# Patient Record
Sex: FEMALE | Employment: UNEMPLOYED | URBAN - METROPOLITAN AREA
[De-identification: names, ages, dates, MRNs, and addresses within clinical notes are randomized per-mention and may not be internally consistent; named-entity substitution may affect disease eponyms.]

---

## 2020-07-03 ENCOUNTER — APPOINTMENT (OUTPATIENT)
Dept: GENERAL RADIOLOGY | Facility: CLINIC | Age: 20
End: 2020-07-03
Attending: EMERGENCY MEDICINE
Payer: OTHER GOVERNMENT

## 2020-07-03 ENCOUNTER — HOSPITAL ENCOUNTER (EMERGENCY)
Facility: CLINIC | Age: 20
Discharge: HOME | End: 2020-07-03
Attending: EMERGENCY MEDICINE | Admitting: EMERGENCY MEDICINE
Payer: OTHER GOVERNMENT

## 2020-07-03 VITALS
HEIGHT: 64 IN | SYSTOLIC BLOOD PRESSURE: 93 MMHG | HEART RATE: 74 BPM | BODY MASS INDEX: 32.44 KG/M2 | TEMPERATURE: 98.7 F | DIASTOLIC BLOOD PRESSURE: 63 MMHG | RESPIRATION RATE: 16 BRPM | WEIGHT: 190 LBS | OXYGEN SATURATION: 100 %

## 2020-07-03 DIAGNOSIS — R05.9 COUGH: ICD-10-CM

## 2020-07-03 DIAGNOSIS — R06.02 SHORTNESS OF BREATH: ICD-10-CM

## 2020-07-03 DIAGNOSIS — U07.1 INFECTION DUE TO 2019-NCOV: ICD-10-CM

## 2020-07-03 LAB
ANION GAP SERPL CALCULATED.3IONS-SCNC: 4 MMOL/L (ref 3–14)
BASOPHILS # BLD AUTO: 0 10E9/L (ref 0–0.2)
BASOPHILS NFR BLD AUTO: 0.3 %
BUN SERPL-MCNC: 7 MG/DL (ref 7–30)
CALCIUM SERPL-MCNC: 8.6 MG/DL (ref 8.5–10.1)
CHLORIDE SERPL-SCNC: 105 MMOL/L (ref 96–110)
CO2 SERPL-SCNC: 28 MMOL/L (ref 20–32)
CREAT SERPL-MCNC: 0.97 MG/DL (ref 0.5–1)
DIFFERENTIAL METHOD BLD: ABNORMAL
EOSINOPHIL # BLD AUTO: 0 10E9/L (ref 0–0.7)
EOSINOPHIL NFR BLD AUTO: 0.3 %
ERYTHROCYTE [DISTWIDTH] IN BLOOD BY AUTOMATED COUNT: 15.9 % (ref 10–15)
GFR SERPL CREATININE-BSD FRML MDRD: 84 ML/MIN/{1.73_M2}
GLUCOSE SERPL-MCNC: 103 MG/DL (ref 70–99)
HCT VFR BLD AUTO: 39 % (ref 35–47)
HGB BLD-MCNC: 12.1 G/DL (ref 11.7–15.7)
IMM GRANULOCYTES # BLD: 0 10E9/L (ref 0–0.4)
IMM GRANULOCYTES NFR BLD: 0 %
LYMPHOCYTES # BLD AUTO: 1.9 10E9/L (ref 0.8–5.3)
LYMPHOCYTES NFR BLD AUTO: 56.8 %
MCH RBC QN AUTO: 27.6 PG (ref 26.5–33)
MCHC RBC AUTO-ENTMCNC: 31 G/DL (ref 31.5–36.5)
MCV RBC AUTO: 89 FL (ref 78–100)
MONOCYTES # BLD AUTO: 0.4 10E9/L (ref 0–1.3)
MONOCYTES NFR BLD AUTO: 12.5 %
NEUTROPHILS # BLD AUTO: 1 10E9/L (ref 1.6–8.3)
NEUTROPHILS NFR BLD AUTO: 30.1 %
NRBC # BLD AUTO: 0 10*3/UL
NRBC BLD AUTO-RTO: 0 /100
PLATELET # BLD AUTO: 255 10E9/L (ref 150–450)
POTASSIUM SERPL-SCNC: 3.8 MMOL/L (ref 3.4–5.3)
RBC # BLD AUTO: 4.39 10E12/L (ref 3.8–5.2)
SODIUM SERPL-SCNC: 137 MMOL/L (ref 133–144)
WBC # BLD AUTO: 3.4 10E9/L (ref 4–11)

## 2020-07-03 PROCEDURE — 71045 X-RAY EXAM CHEST 1 VIEW: CPT

## 2020-07-03 PROCEDURE — 99283 EMERGENCY DEPT VISIT LOW MDM: CPT

## 2020-07-03 PROCEDURE — 85025 COMPLETE CBC W/AUTO DIFF WBC: CPT | Performed by: EMERGENCY MEDICINE

## 2020-07-03 PROCEDURE — U0003 INFECTIOUS AGENT DETECTION BY NUCLEIC ACID (DNA OR RNA); SEVERE ACUTE RESPIRATORY SYNDROME CORONAVIRUS 2 (SARS-COV-2) (CORONAVIRUS DISEASE [COVID-19]), AMPLIFIED PROBE TECHNIQUE, MAKING USE OF HIGH THROUGHPUT TECHNOLOGIES AS DESCRIBED BY CMS-2020-01-R: HCPCS | Performed by: EMERGENCY MEDICINE

## 2020-07-03 PROCEDURE — 99283 EMERGENCY DEPT VISIT LOW MDM: CPT | Mod: Z6 | Performed by: EMERGENCY MEDICINE

## 2020-07-03 PROCEDURE — 80048 BASIC METABOLIC PNL TOTAL CA: CPT | Performed by: EMERGENCY MEDICINE

## 2020-07-03 PROCEDURE — C9803 HOPD COVID-19 SPEC COLLECT: HCPCS

## 2020-07-03 ASSESSMENT — ENCOUNTER SYMPTOMS
NAUSEA: 1
DIARRHEA: 1
MYALGIAS: 1
FEVER: 1
CHILLS: 1
VOMITING: 0
SHORTNESS OF BREATH: 1
NERVOUS/ANXIOUS: 1
RHINORRHEA: 1
SORE THROAT: 1
COUGH: 1
ABDOMINAL PAIN: 0
FATIGUE: 1
BACK PAIN: 1

## 2020-07-03 ASSESSMENT — MIFFLIN-ST. JEOR: SCORE: 1621.83

## 2020-07-03 NOTE — LETTER
July 4, 2020        Erin Loja [facility]  [address] [place] [id number]    This letter provides a written record that you were tested for COVID-19 on 7/3/20.     Your result was positive. This means you have COVID-19 (coronavirus).    This means that we found the virus that causes COVID-19 in your sample.    How can I protect others?If you have symptoms, stay home and away from others (self-isolate) until:You ve had no fever--and no medicine that reduces fever--for 3 full days (72 hours). And      Your other symptoms have gotten better. For example, your cough or breathing has improved. And     At least 10 days have passed since your symptoms started.    If you don t have symptoms: Stay home and away from others (self-isolate) until at least 10 days have passed since your first positive COVID-19 test.    During this time:    Stay in your own room, including for meals. Use your own bathroom if you can.    Stay away from others in your home. No hugging, kissing or shaking hands. No visitors.     Don t go to work, school or anywhere else.     Clean  high touch  surfaces often (doorknobs, counters, handles, etc.). Use a household cleaning spray or wipes. You ll find a full list on the EPA website at www.epa.gov/pesticide-registration/list-n-disinfectants-use-against-sars-cov-2.     Cover your mouth and nose with a mask, tissue or washcloth to avoid spreading germs.    Wash your hands and face often with soap and water.    Caregivers in these groups are at risk for severe illness due to COVID-19:  o People 65 years and older  o People who live in a nursing home or long-term care facility  o People with chronic disease (lung, heart, cancer, diabetes, kidney, liver, immunologic)  o People who have a weakened immune system, including those who:  - Are in cancer treatment  - Take medicine that weakens the immune system, such as corticosteroids  - Had a bone marrow or organ transplant  - Have an immune  deficiency  - Have poorly controlled HIV or AIDS  - Are obese (body mass index of 40 or higher)  - Smoke regularly    Caregivers should wear gloves while washing dishes, handling laundry and cleaning bedrooms and bathrooms.    Wash and dry laundry with special caution. Don t shake dirty laundry, and use the warmest water setting you can.    If you have a weakened immune system, ask your doctor about other actions you should take.    For more tips, go to www.cdc.gov/coronavirus/2019-ncov/downloads/10Things.pdf.    You should not go back to work until you meet the guidelines above for ending your home isolation. You should meet these along with any other guidelines that your employer has.    Employers: This document serves as formal notice of your employee s medical guidelines for going back to work. They must meet the above guidelines before going back to work in person.    How can I take care of myself?    1. Get lots of rest. Drink extra fluids (unless a doctor has told you not to).    2. Take Tylenol (acetaminophen) for fever or pain. If you have liver or kidney problems, ask your family doctor if it s okay to take Tylenol.     Take either:     650 mg (two 325 mg pills) every 4 to 6 hours, or     1,000 mg (two 500 mg pills) every 8 hours as needed.     Note: Don t take more than 3,000 mg in one day. Acetaminophen is found in many medicines (both prescribed and over-the-counter medicines). Read all labels to be sure you don t take too much.    For children, check the Tylenol bottle for the right dose (based on their age or weight).    3. If you have other health problems (like cancer, heart failure, an organ transplant or severe kidney disease): Call your specialty clinic if you don t feel better in the next 2 days.    4. Know when to call 911: Emergency warning signs include:    Trouble breathing or shortness of breath    Pain or pressure in the chest that doesn t go away    Feeling confused like you haven t felt  before, or not being able to wake up    Bluish-colored lips or face    5. Sign up for [employer]. We know it s scary to hear that you have COVID-19. We want to track your symptoms to make sure you re okay over the next 2 weeks. Please look for an email from [employer]--this is a free, online program that we ll use to keep in touch. To sign up, follow the link in the email. Learn more at [email or web address].      Where can I get more information?    [facility]: [email or web address]    Coronavirus Basics: www.health.[email or web address].us/diseases/coronavirus/basics.html    What to Do If You re Sick: www.cdc.gov/coronavirus/2019-ncov/about/steps-when-sick.html    Ending Home Isolation: www.cdc.gov/coronavirus/2019-ncov/hcp/disposition-in-home-patients.html     Caring for Someone with COVID-19: www.cdc.gov/coronavirus/2019-ncov/if-you-are-sick/care-for-someone.html     [facility] clinical trials (COVID-19 research studies): clinicalaffairs.[facility]-clinical-trials

## 2020-07-03 NOTE — ED AVS SNAPSHOT
[facility], [place], Emergency Department  [address] [place] [id number]  Phone:  [phone]                                    Erin Maria   MRN: [id number]    Department:  [facility], Emergency Department   Date of Visit:  7/3/2020           After Visit Summary Signature Page    I have received my discharge instructions, and my questions have been answered. I have discussed any challenges I see with this plan with the nurse or doctor.    ..........................................................................................................................................  Patient/Patient Representative Signature      ..........................................................................................................................................  Patient Representative Print Name and Relationship to Patient    ..................................................               ................................................  Date                                   Time    ..........................................................................................................................................  Reviewed by Signature/Title    ...................................................              ..............................................  Date                                               Time          22EPIC Rev 08/18

## 2020-07-04 ENCOUNTER — TELEPHONE (OUTPATIENT)
Dept: NURSING | Facility: CLINIC | Age: 20
End: 2020-07-04

## 2020-07-04 LAB
SARS-COV-2 RNA SPEC QL NAA+PROBE: ABNORMAL
SPECIMEN SOURCE: ABNORMAL

## 2020-07-04 NOTE — ED PROVIDER NOTES
[place] EMERGENCY DEPARTMENT ([facility])  7/03/20    History     Chief Complaint   Patient presents with     Shortness of Breath     History was provided by the patient and medical records.        Erin Maria is a generally healthy 19 year old female who presents for evaluation of shortness of breath, cough, chest tightness and fatigue.  Patient reports being at a gathering 6/28/2020, 5 days ago, with approximately 5 people in an apartment.  She feels she was exposed to someone with COVID at that time but states no one had a confirmed COVID result.  On 6/30, 3 days ago, patient developed a fever of 101.3, chills, migraine, fatigue and decreased appetite.  On 7/1, the patient developed a cough but had her appetite improve and fever decreased.  Yesterday she felt she was beginning to feel better.  This morning she awoke with shortness of breath with movement, chest tightness, worsening cough, low back pain/myalgias, fatigue, nausea and diarrhea.  She states this made her feel very anxious which attributed to her decision to be evaluated today.  Patient denies change in taste or smell, vomiting, abdominal pain, dysuria, or hematuria.  Patient has been taking Tylenol with some relief.  Currently, the patient feels short of breath but feels her other symptoms have resolved.  Patient's last menstrual period was 1 week ago.    Past Medical History  No past medical history on file.  No past surgical history on file.  No current outpatient medications on file.    Allergies   Allergen Reactions     No Known Drug Allergy      Past medical history, past surgical history, medications, and allergies were reviewed with the patient. Additional pertinent items: None    Family History  No family history on file.  Family history was reviewed with the patient. Additional pertinent items: None    Social History  Social History     Tobacco Use     Smoking status: Not on file   Substance Use Topics     Alcohol  "use: Not on file     Drug use: Not on file      Social history was reviewed with the patient. Additional pertinent items: None    Review of Systems   Constitutional: Positive for chills, fatigue and fever.   HENT: Positive for rhinorrhea and sore throat.    Respiratory: Positive for cough and shortness of breath.    Cardiovascular: Negative for chest pain.   Gastrointestinal: Positive for diarrhea and nausea. Negative for abdominal pain and vomiting.   Musculoskeletal: Positive for back pain and myalgias.   Psychiatric/Behavioral: The patient is nervous/anxious.    All other systems reviewed and are negative.    A complete review of systems was performed with pertinent positives and negatives noted in the HPI, and all other systems negative.    Physical Exam   BP: (!) 131/90  Pulse: 90  Temp: 98.7  F (37.1  C)  Resp: 18  Height: 162.6 cm (5' 4\")  Weight: 86.2 kg (190 lb)  SpO2: 96 %  Physical Exam  Vitals signs and nursing note reviewed.   Constitutional:       General: She is not in acute distress.     Appearance: She is well-developed. She is not diaphoretic.   HENT:      Head: Normocephalic and atraumatic.   Eyes:      Conjunctiva/sclera: Conjunctivae normal.      Pupils: Pupils are equal, round, and reactive to light.   Neck:      Musculoskeletal: Normal range of motion and neck supple.   Cardiovascular:      Rate and Rhythm: Normal rate and regular rhythm.      Heart sounds: Normal heart sounds.   Pulmonary:      Effort: Pulmonary effort is normal. No respiratory distress.      Breath sounds: Normal breath sounds. No wheezing or rales.   Abdominal:      General: Bowel sounds are normal. There is no distension.      Palpations: Abdomen is soft.      Tenderness: There is no abdominal tenderness.   Skin:     General: Skin is warm and dry.   Neurological:      Mental Status: She is alert and oriented to person, place, and time.         ED Course       7:52 PM  The patient was seen and examined by Dr. Conner " Michael in Room ED 37.     Procedures                              Results for orders placed or performed during the hospital encounter of 07/03/20   XR Chest Port 1 View     Status: None    Narrative    Exam: XR CHEST PORT 1 VW, 7/3/2020 9:07 PM    Indication: SOB    Comparison: None    Findings:   Portable radiograph of the chest. Cardiac silhouette is normal size.  No focal airspace opacities. No pneumothorax. No pleural effusion.  Visualized upper abdomen is unremarkable. No acute osseous  abnormalities.      Impression    Impression: No focal airspace opacities.     I have personally reviewed the examination and initial interpretation  and I agree with the findings.    ARMIN STAPLETON,    Basic metabolic panel     Status: Abnormal   Result Value Ref Range    Sodium 137 133 - 144 mmol/L    Potassium 3.8 3.4 - 5.3 mmol/L    Chloride 105 96 - 110 mmol/L    Carbon Dioxide 28 20 - 32 mmol/L    Anion Gap 4 3 - 14 mmol/L    Glucose 103 (H) 70 - 99 mg/dL    Urea Nitrogen 7 7 - 30 mg/dL    Creatinine 0.97 0.50 - 1.00 mg/dL    GFR Estimate 84 >60 mL/min/[1.73_m2]    GFR Estimate If Black >90 >60 mL/min/[1.73_m2]    Calcium 8.6 8.5 - 10.1 mg/dL   CBC with platelets differential     Status: Abnormal   Result Value Ref Range    WBC 3.4 (L) 4.0 - 11.0 10e9/L    RBC Count 4.39 3.8 - 5.2 10e12/L    Hemoglobin 12.1 11.7 - 15.7 g/dL    Hematocrit 39.0 35.0 - 47.0 %    MCV 89 78 - 100 fl    MCH 27.6 26.5 - 33.0 pg    MCHC 31.0 (L) 31.5 - 36.5 g/dL    RDW 15.9 (H) 10.0 - 15.0 %    Platelet Count 255 150 - 450 10e9/L    Diff Method Automated Method     % Neutrophils 30.1 %    % Lymphocytes 56.8 %    % Monocytes 12.5 %    % Eosinophils 0.3 %    % Basophils 0.3 %    % Immature Granulocytes 0.0 %    Nucleated RBCs 0 0 /100    Absolute Neutrophil 1.0 (L) 1.6 - 8.3 10e9/L    Absolute Lymphocytes 1.9 0.8 - 5.3 10e9/L    Absolute Monocytes 0.4 0.0 - 1.3 10e9/L    Absolute Eosinophils 0.0 0.0 - 0.7 10e9/L    Absolute Basophils 0.0 0.0 -  0.2 10e9/L    Abs Immature Granulocytes 0.0 0 - 0.4 10e9/L    Absolute Nucleated RBC 0.0      Results for orders placed or performed during the hospital encounter of 07/03/20   XR Chest Port 1 View     Status: None    Narrative    Exam: XR CHEST PORT 1 VW, 7/3/2020 9:07 PM    Indication: SOB    Comparison: None    Findings:   Portable radiograph of the chest. Cardiac silhouette is normal size.  No focal airspace opacities. No pneumothorax. No pleural effusion.  Visualized upper abdomen is unremarkable. No acute osseous  abnormalities.      Impression    Impression: No focal airspace opacities.     I have personally reviewed the examination and initial interpretation  and I agree with the findings.    ARMIN STAPLETON,    Basic metabolic panel     Status: Abnormal   Result Value Ref Range    Sodium 137 133 - 144 mmol/L    Potassium 3.8 3.4 - 5.3 mmol/L    Chloride 105 96 - 110 mmol/L    Carbon Dioxide 28 20 - 32 mmol/L    Anion Gap 4 3 - 14 mmol/L    Glucose 103 (H) 70 - 99 mg/dL    Urea Nitrogen 7 7 - 30 mg/dL    Creatinine 0.97 0.50 - 1.00 mg/dL    GFR Estimate 84 >60 mL/min/[1.73_m2]    GFR Estimate If Black >90 >60 mL/min/[1.73_m2]    Calcium 8.6 8.5 - 10.1 mg/dL   CBC with platelets differential     Status: Abnormal   Result Value Ref Range    WBC 3.4 (L) 4.0 - 11.0 10e9/L    RBC Count 4.39 3.8 - 5.2 10e12/L    Hemoglobin 12.1 11.7 - 15.7 g/dL    Hematocrit 39.0 35.0 - 47.0 %    MCV 89 78 - 100 fl    MCH 27.6 26.5 - 33.0 pg    MCHC 31.0 (L) 31.5 - 36.5 g/dL    RDW 15.9 (H) 10.0 - 15.0 %    Platelet Count 255 150 - 450 10e9/L    Diff Method Automated Method     % Neutrophils 30.1 %    % Lymphocytes 56.8 %    % Monocytes 12.5 %    % Eosinophils 0.3 %    % Basophils 0.3 %    % Immature Granulocytes 0.0 %    Nucleated RBCs 0 0 /100    Absolute Neutrophil 1.0 (L) 1.6 - 8.3 10e9/L    Absolute Lymphocytes 1.9 0.8 - 5.3 10e9/L    Absolute Monocytes 0.4 0.0 - 1.3 10e9/L    Absolute Eosinophils 0.0 0.0 - 0.7 10e9/L     Absolute Basophils 0.0 0.0 - 0.2 10e9/L    Abs Immature Granulocytes 0.0 0 - 0.4 10e9/L    Absolute Nucleated RBC 0.0           Assessments & Plan (with Medical Decision Making)   Patient presents to the emergency department today for the above complaints.  It appears that she was at a a small gathering of people 5 days ago and then 2 days after that began to experience symptoms including fevers, chills, upper respiratory symptoms, cough, myalgias, as well as nausea, and diarrhea.  Certainly it appears that she likely has some sort of infectious etiology, COVID-19 is a possibility.  Any other sort of viral process is also possible.  Temperature here in the emergency department is 98.7, she is saturating 100% on room air and does not appear to be toxic.    CBC demonstrates mild leukopenia with a white count of 3.4, otherwise within normal limits, BMP within normal limits.  COVID-19 swab was ordered and is pending at this time.  Chest x-ray is read as clear by radiology.    I had a discussion with the patient where I did explain that it is possible she has a viral respiratory infection.  Currently she is afebrile and is saturating at 100% on room air, it appears most of her symptoms have largely resolved.  I have advised her to continue to isolate herself in case this does turn out to be COVID.  She will be notified via my chart or via letter of her COVID results.  It is also possible that anxiety is contributing to her problems today as she does report she feels very anxious about this and that might be exacerbating her subjective dyspnea.  I have advised her to follow-up with her primary clinic if symptoms persist.  She can come back to the emergency department if she is noticing severe shortness of breath or other new symptoms.  Patient verbalizes understanding.    I have reviewed the nursing notes. I have reviewed the findings, diagnosis, plan and need for follow up with the patient.    There are no discharge  medications for this patient.      Final diagnoses:   Cough   Shortness of breath     I, Kennedy Barrett, am serving as a trained medical scribe to document services personally performed by Dalila Rodriguez MD, based on the provider's statements to me.      I, Dalila Rodriguez MD, was physically present and have reviewed and verified the accuracy of this note documented by Kennedy Barrett.     --  Kennedy Barrett   Emergency Medicine   [facility], [place], EMERGENCY DEPARTMENT  7/3/2020     Dalila Rodriguez MD  07/03/20 0130

## 2020-07-04 NOTE — DISCHARGE INSTRUCTIONS
You have been seen in the emergency department today for shortness of breath and symptoms of a likely respiratory infection.  You do not have a fever here in the emergency department and your oxygen level is 100%.  Your chest x-ray is normal and does not show any sign of pneumonia at this time.  Your white blood cell count is slightly below normal which can sometimes happen with a viral infection.  We have checked a swab for COVID, this will take 2 to 3 days to come back.  You can check via [email or web address] or you will receive a letter in the mail with your results if you do not have MyChart.     In the meantime, we recommend that you continue to isolate yourself to try to prevent spread to others in case you do have COVID.  Continue to wear a mask, wash your hands well and use hand , also continue to social distance.    If you notice severe symptoms, severe shortness of breath, or other new concerns, certainly come back to the emergency department for a recheck.

## 2020-07-04 NOTE — TELEPHONE ENCOUNTER
Coronavirus (COVID-19) Notification    Reason for call  Notify of POSITIVE  COVID-19 lab result, assess symptoms,  review [facility] recommendations    Lab Result   Lab test for 2019-nCoV rRt-PCR or SARS-COV-2 PCR  Oropharyngeal AND/OR nasopharyngeal swabs were POSITIVE for 2019-nCoV RNA [OR] SARS-COV-2 RNA (COVID-19) RNA     We have been unable to reach Patient by phone at this time to notify of their Positive COVID-19 result.  Left voicemail message requesting a call back between 8 am to 6:30 p.m. to [phone] [facility] for results.   (Weekends, this line is available from 10A to 6:30P)     POSITIVE COVID-19 Letter sent.    Claudia Inman RN  Covid-19 Results Team  [phone]

## 2020-07-05 ENCOUNTER — NURSE TRIAGE (OUTPATIENT)
Dept: NURSING | Facility: CLINIC | Age: 20
End: 2020-07-05

## 2020-07-05 NOTE — TELEPHONE ENCOUNTER
Coronavirus (COVID-19) Notification    Patient  Erin Maria--Name and  veriifed    Reason for call  Notify of Positive Coronavirus (COVID-19) lab results, assess symptoms,  review [facility] recommendations    Lab Result    Lab test:  2019-nCoV rRt-PCR or SARS-CoV-2 PCR    Oropharyngeal AND/OR nasopharyngeal swabs is POSITIVE for 2019-nCoV RNA/SARS-COV-2 PCR (COVID-19 virus)    RN Recommendations/Instructions per [facility] Coronavirus COVID-19 recommendations    Brief introduction script  Hi, My name is Radha and I am calling on behalf of [employer].  We were notified that your Coronavirus test (COVID-19) for was POSITIVE for the virus.  I have some information to relay to you but first I wanted to mention that the [place] Dept of Health will be contacting you shortly [it's possible MD already called Patient] to talk to you more about how you are feeling and other people you have had contact with who might now also have the virus.  Also, [facility] is Partnering with the [facility] for Covid-19 research, you may be contacted directly by research staff.    Assessment (Inquire about Patient's current symptoms)  Feeling sick, fever, cough, chills--went away-back now-cough, nasal congestion.  Feels a little dazed    If at time of call, Patients symptoms hare worsened, the Patient should contact 911 or have someone drive them to Emergency Dept promptly:      If Patient calling 911, inform 911 personal that you have tested positive for the Coronavirus (COVID-19).  Place mask on and await 911 to arrive.    If Emergency Dept, If possible, please have another adult drive you to the Emergency Dept but you need to wear mask when in contact with other people.      Review information with Patient    Your result was positive. This means you have COVID-19 (coronavirus).  We have sent you a letter that reviews the information that I'll be reviewing with you now.    How can I protect  others?    If you have symptoms: stay home and away from others (self-isolate) until:    You've had no fever--and no medicine that reduces fever--for 3 full days (72 hours). And      Your other symptoms have gotten better. For example, your cough or breathing has improved. And     At least 10 days have passed since your symptoms started.    If you don't have symptoms: Stay home and away from others (self-isolate) until at least 10 days have passed since your first positive COVID-19 test. (Date test collected)    During this time:    Stay in your own room, including for meals. Use your own bathroom if you can.    Stay away from others in your home. No hugging, kissing or shaking hands. No visitors.     Don't go to work, school or anywhere else.     Clean  high touch  surfaces often (doorknobs, counters, handles, etc.). Use a household cleaning spray or wipes. You'll find a full list on the EPA website at www.epa.gov/pesticide-registration/list-n-disinfectants-use-against-sars-cov-2.     Cover your mouth and nose with a mask, tissue or washcloth to avoid spreading germs.    Wash your hands and face often with soap and water.    Caregivers in these groups are at risk for severe illness due to COVID-19:  o People 65 years and older  o People who live in a nursing home or long-term care facility  o People with chronic disease (lung, heart, cancer, diabetes, kidney, liver, immunologic)  o People who have a weakened immune system, including those who:  - Are in cancer treatment  - Take medicine that weakens the immune system, such as corticosteroids  - Had a bone marrow or organ transplant  - Have an immune deficiency  - Have poorly controlled HIV or AIDS  - Are obese (body mass index of 40 or higher)  - Smoke regularly    Caregivers should wear gloves while washing dishes, handling laundry and cleaning bedrooms and bathrooms.    Wash and dry laundry with special caution. Don't shake dirty laundry, and use the warmest  water setting you can.    If you have a weakened immune system, ask your doctor about other actions you should take.    For more tips, go to www.cdc.gov/coronavirus/2019-ncov/downloads/10Things.pdf.    You should not go back to work until you meet the guidelines above for ending your home isolation. You should meet these along with any other guidelines that your employer has.    Employers: This document serves as formal notice of your employee's medical guidelines for going back to work. They must meet the above guidelines before going back to work in person.    How can I take care of myself?    1. Get lots of rest. Drink extra fluids (unless a doctor has told you not to).    2. Take Tylenol (acetaminophen) for fever or pain. If you have liver or kidney problems, ask your family doctor if it's okay to take Tylenol.     Take either:     650 mg (two 325 mg pills) every 4 to 6 hours, or     1,000 mg (two 500 mg pills) every 8 hours as needed.     Note: Don't take more than 3,000 mg in one day. Acetaminophen is found in many medicines (both prescribed and over-the-counter medicines). Read all labels to be sure you don't take too much.    For children, check the Tylenol bottle for the right dose (based on their age or weight).    3. If you have other health problems (like cancer, heart failure, an organ transplant or severe kidney disease): Call your specialty clinic if you don't feel better in the next 2 days.    4. Know when to call 911: Emergency warning signs include:    Trouble breathing or shortness of breath    Pain or pressure in the chest that doesn't go away    Feeling confused like you haven't felt before, or not being able to wake up    Bluish-colored lips or face    5. Sign up for [employer]. We know it's scary to hear that you have COVID-19. We want to track your symptoms to make sure you're okay over the next 2 weeks. Please look for an email from [employer]--this is a free, online program that we'll  use to keep in touch. To sign up, follow the link in the email. Learn more at [email or web address].    Where can I get more information?    [facility]: [email or web address]    Coronavirus Basics: www.health.[facility].[place]/diseases/coronavirus/basics.html    What to Do If You're Sick: www.cdc.gov/coronavirus/2019-ncov/about/steps-when-sick.html    Ending Home Isolation: www.cdc.gov/coronavirus/2019-ncov/hcp/disposition-in-home-patients.html     Caring for Someone with COVID-19: www.cdc.gov/coronavirus/2019-ncov/if-you-are-sick/care-for-someone.html     [facility] clinical trials (COVID-19 research studies): clinicalaffairs.[facility]/umn-clinical-trials     Positive COVID-19 letter sent (Yes/No):  Yes-previous encounter    [Name]  Radha Gramajo RN

## 2020-07-05 NOTE — TELEPHONE ENCOUNTER
[2nd attempt - When pt calls back, please update preferred contact number and verify other phone numbers in demographics are up to date]    Coronavirus (COVID-19) Notification    Reason for call  Notify of POSITIVE  COVID-19 lab result, assess symptoms,  review [facility] recommendations    Lab Result   Lab test for 2019-nCoV rRt-PCR or SARS-COV-2 PCR  Oropharyngeal AND/OR nasopharyngeal swabs were POSITIVE for 2019-nCoV RNA [OR] SARS-COV-2 RNA (COVID-19) RNA     We have been unable to reach Patient by phone at this time to notify of their Positive COVID-19 result.  Left voicemail message requesting a call back between 8 am to 6:30 p.m. to [phone] [facility] for results.   (Weekends, this line is available from 10A to 6:30P)     POSITIVE COVID-19 Letter sent.    [Name]  Parad[place] V[place], PEBBLESN, RN

## 2020-07-05 NOTE — TELEPHONE ENCOUNTER
Patient calling stating her dad received a message to call back regarding lab results.     Patient is requesting COVID 19 lab results. Please see telephone encounter from 7/4/20.    Warm transferred to COVID results nurse at  (available 10 AM-630PM).     Noemy Bush RN  [facility]    COVID 19 Nurse Triage Plan/Patient Instructions    Please be aware that novel coronavirus (COVID-19) may be circulating in the community. If you develop symptoms such as fever, cough, or SOB or if you have concerns about the presence of another infection including coronavirus (COVID-19), please contact your health care provider or visit [email or web address].     Disposition/Instructions    Patient to stay at home and follow home care protocol based instructions.     Thank you for taking steps to prevent the spread of this virus.  o Limit your contact with others.  o Wear a simple mask to cover your cough.  o Wash your hands well and often.    Resources    [facility]: About COVID-19: [email or web address]    CDC: What to Do If You're Sick: www.cdc.gov/coronavirus/2019-ncov/about/steps-when-sick.html    CDC: Ending Home Isolation: www.cdc.gov/coronavirus/2019-ncov/hcp/disposition-in-home-patients.html     CDC: Caring for Someone: www.cdc.gov/coronavirus/2019-ncov/if-you-are-sick/care-for-someone.html     [facility]: Interim Guidance for Hospital Discharge to Home: www.health.[facility].[place]/diseases/coronavirus/hcp/hospdischarge.pdf    [facility] clinical trials (COVID-19 research studies): clinicalaffairs.[facility]/umn-clinical-trials     Below are the COVID-19 hotlines at the [place] Department of Health ([facility]). Interpreters are available.   o For health questions: Call 829-557-9741 or 1-694.155.7691 (7 a.m. to 7 p.m.)  o For questions about schools and childcare: Call 711-863-4243 or 1-134.149.7404 (7 a.m. to 7 p.m.)                       Additional Information    Negative: Lab result  questions    Negative: [1] Caller is not with the child AND [2] is reporting urgent symptoms    Negative: Medication or pharmacy questions    Negative: Caller is rude or angry    Negative: Caller cannot be reached by phone    Negative: Caller has already spoken to PCP or another triager    Negative: RN needs further essential information from caller in order to complete triage    Negative: Requesting regular office appointment    Negative: Requesting referral to a specialist    Negative: [1] Caller requesting nonurgent health information AND [2] PCP's office is the best resource    Health Information question, no triage required and triager able to answer question    Protocols used: INFORMATION ONLY CALL - NO TRIAGE-P-

## 2020-11-29 ENCOUNTER — HEALTH MAINTENANCE LETTER (OUTPATIENT)
Age: 20
End: 2020-11-29

## 2021-09-25 ENCOUNTER — HEALTH MAINTENANCE LETTER (OUTPATIENT)
Age: 21
End: 2021-09-25

## 2022-01-09 ENCOUNTER — HEALTH MAINTENANCE LETTER (OUTPATIENT)
Age: 22
End: 2022-01-09

## 2022-12-26 ENCOUNTER — HEALTH MAINTENANCE LETTER (OUTPATIENT)
Age: 22
End: 2022-12-26

## 2023-04-16 ENCOUNTER — HEALTH MAINTENANCE LETTER (OUTPATIENT)
Age: 23
End: 2023-04-16

## 2023-05-30 ENCOUNTER — ANCILLARY PROCEDURE (OUTPATIENT)
Dept: GENERAL RADIOLOGY | Facility: CLINIC | Age: 23
End: 2023-05-30
Attending: FAMILY MEDICINE
Payer: COMMERCIAL

## 2023-05-30 ENCOUNTER — OFFICE VISIT (OUTPATIENT)
Dept: URGENT CARE | Facility: URGENT CARE | Age: 23
End: 2023-05-30
Payer: COMMERCIAL

## 2023-05-30 VITALS
WEIGHT: 194.3 LBS | DIASTOLIC BLOOD PRESSURE: 75 MMHG | BODY MASS INDEX: 33.35 KG/M2 | SYSTOLIC BLOOD PRESSURE: 118 MMHG | RESPIRATION RATE: 16 BRPM | HEART RATE: 75 BPM | TEMPERATURE: 97.8 F | OXYGEN SATURATION: 97 %

## 2023-05-30 DIAGNOSIS — K59.01 SLOW TRANSIT CONSTIPATION: ICD-10-CM

## 2023-05-30 DIAGNOSIS — K59.01 SLOW TRANSIT CONSTIPATION: Primary | ICD-10-CM

## 2023-05-30 PROCEDURE — 99203 OFFICE O/P NEW LOW 30 MIN: CPT | Performed by: FAMILY MEDICINE

## 2023-05-30 PROCEDURE — 74018 RADEX ABDOMEN 1 VIEW: CPT | Mod: TC | Performed by: RADIOLOGY

## 2023-05-30 RX ORDER — POLYETHYLENE GLYCOL 3350 17 G/17G
1 POWDER, FOR SOLUTION ORAL 2 TIMES DAILY
Qty: 765 G | Refills: 3 | Status: SHIPPED | OUTPATIENT
Start: 2023-05-30

## 2023-05-30 NOTE — PROGRESS NOTES
(K59.01) Slow transit constipation  (primary encounter diagnosis)  Comment:   Plan: XR KUB, polyethylene glycol (MIRALAX) 17         GM/Dose powder, CANCELED: XR Abdomen Upright         Only            Advised that she use MiraLAX twice daily and if not improving at all, she could go to 3 times daily.  Try to use it twice tonight.  Then adjust dose if she has success.    Return for reevaluation if not improving.      CHIEF COMPLAINT    Constipation noted over the last 2 weeks.      HISTORY    This is a 22-year-old woman who has experienced lack of bowel movement, abdominal bloating and some cramping for about 2 weeks.  She is not having nausea or vomiting.  No melena or blood in stool.    She tried milk of magnesia but got only some liquid stool.    She does take some herbal medication.      REVIEW OF SYSTEMS    No fever.  No cough or breathing problems.  No chest pain.  No rash.  No swelling.      EXAM  /75   Pulse 75   Temp 97.8  F (36.6  C) (Tympanic)   Resp 16   Wt 88.1 kg (194 lb 4.8 oz)   LMP 05/27/2023   SpO2 97%   BMI 33.35 kg/m      Patient appears well in general.  NAD.  HEENT unremarkable.  Neck no masses.  Nonlabored breathing.  Abdominal fullness to palpation without localized tenderness or guarding, active bowel sounds which appear normal.  No lower extremity edema.      X-ray  Considerable stool present throughout the colon.

## 2023-05-30 NOTE — LETTER
May 30, 2023        Erin Maria        Unable to work May 31.        Arsenio Griffith MD on 5/30/2023 at 4:45 PM

## 2024-04-14 ENCOUNTER — HEALTH MAINTENANCE LETTER (OUTPATIENT)
Age: 24
End: 2024-04-14

## 2024-12-31 ENCOUNTER — MYC MEDICAL ADVICE (OUTPATIENT)
Dept: FAMILY MEDICINE | Facility: CLINIC | Age: 24
End: 2024-12-31
Payer: COMMERCIAL

## 2024-12-31 NOTE — TELEPHONE ENCOUNTER
Patient Returning Call    Reason for call:  return message    Information relayed to patient:  patient verified the address on the ID-patient is requesting to mail the ID to the address. TC at clinic will be mailing ID to patient at this time.    Patient has additional questions:  No      Could we send this information to you in [employer] or would you prefer to receive a phone call?:   preferred call back number: [phone]

## 2025-04-19 ENCOUNTER — HEALTH MAINTENANCE LETTER (OUTPATIENT)
Age: 25
End: 2025-04-19